# Patient Record
Sex: FEMALE | Race: OTHER | Employment: UNEMPLOYED | URBAN - METROPOLITAN AREA
[De-identification: names, ages, dates, MRNs, and addresses within clinical notes are randomized per-mention and may not be internally consistent; named-entity substitution may affect disease eponyms.]

---

## 2017-11-28 ENCOUNTER — OFFICE VISIT (OUTPATIENT)
Dept: INTERNAL MEDICINE CLINIC | Facility: CLINIC | Age: 22
End: 2017-11-28

## 2017-11-28 VITALS
BODY MASS INDEX: 22.09 KG/M2 | TEMPERATURE: 98 F | WEIGHT: 117 LBS | OXYGEN SATURATION: 98 % | HEART RATE: 80 BPM | SYSTOLIC BLOOD PRESSURE: 100 MMHG | HEIGHT: 61 IN | DIASTOLIC BLOOD PRESSURE: 61 MMHG

## 2017-11-28 DIAGNOSIS — J02.9 SORE THROAT: ICD-10-CM

## 2017-11-28 DIAGNOSIS — J06.9 URI, ACUTE: Primary | ICD-10-CM

## 2017-11-28 DIAGNOSIS — Z76.89 ENCOUNTER TO ESTABLISH CARE: ICD-10-CM

## 2017-11-28 PROCEDURE — 99212 OFFICE O/P EST SF 10 MIN: CPT | Performed by: INTERNAL MEDICINE

## 2017-11-28 PROCEDURE — 99203 OFFICE O/P NEW LOW 30 MIN: CPT | Performed by: INTERNAL MEDICINE

## 2017-11-28 RX ORDER — FLUTICASONE PROPIONATE 50 MCG
2 SPRAY, SUSPENSION (ML) NASAL DAILY
Qty: 1 BOTTLE | Refills: 1 | Status: SHIPPED | OUTPATIENT
Start: 2017-11-28

## 2017-11-28 NOTE — PROGRESS NOTES
HPI:    Patient ID: González Ibrahim is a 25year old female. HPI today complaining of sore throat ,soem pain when she is swallowing for the past 2-3 days. She states that her mom had a similar symptoms prior to her  .   Since yesterday she is having  cough Outpatient Prescriptions:  Fluticasone Propionate 50 MCG/ACT Nasal Suspension 2 sprays by Each Nare route daily. Disp: 1 Bottle Rfl: 1     Allergies:No Known Allergies    HISTORY:  History reviewed. No pertinent past medical history. History reviewed.  No sounds and intact distal pulses. Exam reveals no gallop and no friction rub. No murmur heard. Pulmonary/Chest: Effort normal and breath sounds normal. No accessory muscle usage. No respiratory distress. She has no wheezes. She has no rales.  She exhibi

## 2019-01-09 ENCOUNTER — OFFICE VISIT (OUTPATIENT)
Dept: INTERNAL MEDICINE CLINIC | Facility: CLINIC | Age: 24
End: 2019-01-09

## 2019-01-09 VITALS
TEMPERATURE: 99 F | HEIGHT: 61 IN | DIASTOLIC BLOOD PRESSURE: 78 MMHG | WEIGHT: 129 LBS | SYSTOLIC BLOOD PRESSURE: 110 MMHG | HEART RATE: 78 BPM | RESPIRATION RATE: 18 BRPM | BODY MASS INDEX: 24.35 KG/M2

## 2019-01-09 DIAGNOSIS — J02.9 SORE THROAT: ICD-10-CM

## 2019-01-09 DIAGNOSIS — R10.32 INTERMITTENT LEFT LOWER QUADRANT ABDOMINAL PAIN: Primary | ICD-10-CM

## 2019-01-09 PROCEDURE — 99214 OFFICE O/P EST MOD 30 MIN: CPT | Performed by: INTERNAL MEDICINE

## 2019-01-09 NOTE — PROGRESS NOTES
HPI:    Patient ID: Anthony Vaughan is a 26year old female. HPI      She came in today complaining of left lower quadrant abdominal pain. According to her this symptoms us or go ongoing for almost 2 years.   She had this left lower quadrant abdominal pain hematuria and urgency. Musculoskeletal: Negative for arthralgias, back pain, gait problem, joint swelling, myalgias, neck pain and neck stiffness. Allergic/Immunologic: Negative.     Neurological: Negative for dizziness, tremors, speech difficulty, weak exudate, posterior oropharyngeal edema or posterior oropharyngeal erythema. Eyes: Conjunctivae and EOM are normal. Pupils are equal, round, and reactive to light. Right eye exhibits no discharge. Left eye exhibits no discharge. No scleral icterus.    Neck salt gargles can help, Tylenol as needed ,if pain gets worse,, if nay fever , difficulty swallowing call us     Orders Placed This Encounter      POC Rapid Strep [[id number]]      Meds This Visit:  Requested Prescriptions      No prescriptions requested or orde

## 2019-01-09 NOTE — PATIENT INSTRUCTIONS
Intermittent left lower quadrant abdominal pain  (primary encounter diagnosis) etiology unclear her previous ultrasound showed some left ovarian cyst follow-up with OB tomorrow for possible repeat ultrasound pelvic.   We should rule out a GI etiology as wel

## 2019-01-24 ENCOUNTER — HOSPITAL ENCOUNTER (OUTPATIENT)
Dept: CT IMAGING | Facility: HOSPITAL | Age: 24
Discharge: HOME | End: 2019-01-24
Attending: INTERNAL MEDICINE
Payer: COMMERCIAL

## 2019-01-24 DIAGNOSIS — R10.32 INTERMITTENT LEFT LOWER QUADRANT ABDOMINAL PAIN: ICD-10-CM

## 2019-01-24 LAB — CREAT BLD-MCNC: 0.6 MG/DL (ref 0.5–1.5)

## 2019-01-24 PROCEDURE — 82565 ASSAY OF CREATININE: CPT

## 2019-01-24 PROCEDURE — 74177 CT ABD & PELVIS W/CONTRAST: CPT | Performed by: INTERNAL MEDICINE

## 2019-01-28 ENCOUNTER — TELEPHONE (OUTPATIENT)
Dept: INTERNAL MEDICINE CLINIC | Facility: CLINIC | Age: 24
End: 2019-01-28

## 2019-01-28 NOTE — TELEPHONE ENCOUNTER
Patient called, her ob/gyne Dr. Ramya Castro  is asking for CT results to be faxed to, #[phone]. Ok to send?

## 2019-06-06 ENCOUNTER — OFFICE VISIT (OUTPATIENT)
Dept: INTERNAL MEDICINE CLINIC | Facility: CLINIC | Age: 24
End: 2019-06-06
Payer: COMMERCIAL

## 2019-06-06 VITALS
BODY MASS INDEX: 24.92 KG/M2 | RESPIRATION RATE: 18 BRPM | WEIGHT: 132 LBS | HEIGHT: 61 IN | TEMPERATURE: 99 F | DIASTOLIC BLOOD PRESSURE: 65 MMHG | SYSTOLIC BLOOD PRESSURE: 100 MMHG | HEART RATE: 78 BPM

## 2019-06-06 DIAGNOSIS — R21 RASH: Primary | ICD-10-CM

## 2019-06-06 PROCEDURE — 99213 OFFICE O/P EST LOW 20 MIN: CPT | Performed by: INTERNAL MEDICINE

## 2019-06-06 RX ORDER — DIAPER,BRIEF,INFANT-TODD,DISP
EACH MISCELLANEOUS
Qty: 1 TUBE | Refills: 0 | Status: SHIPPED | OUTPATIENT
Start: 2019-06-06 | End: 2019-12-26 | Stop reason: ALTCHOICE

## 2019-06-06 NOTE — PATIENT INSTRUCTIONS
Contact dermatitis - avoid using makeup , clean with warm water and soap, cortisone cream bid  No more then 5 days , if any swelling , worsening rash call us  Go to er

## 2019-06-06 NOTE — PROGRESS NOTES
HPI:    Patient ID: Cris Farley is a 22year old female. HPI rash      According to her started on Monday , she was at the movie theater and she remove her lipstick with paper and Chapstick. . She states that when she went home she noticed some redness a Medications:  Fluticasone Propionate 50 MCG/ACT Nasal Suspension 2 sprays by Each Nare route daily. Disp: 1 Bottle Rfl: 1     Allergies:No Known Allergies    HISTORY:  No past medical history on file. No past surgical history on file.    Family History heard.  Pulmonary/Chest: Effort normal and breath sounds normal. No accessory muscle usage. No respiratory distress. She has no wheezes. She has no rales. She exhibits no tenderness. Abdominal: Soft.  Bowel sounds are normal. She exhibits no shifting dull

## 2019-11-20 ENCOUNTER — OFFICE VISIT (OUTPATIENT)
Dept: INTERNAL MEDICINE CLINIC | Facility: CLINIC | Age: 24
End: 2019-11-20
Payer: COMMERCIAL

## 2019-11-20 VITALS
HEART RATE: 64 BPM | RESPIRATION RATE: 18 BRPM | BODY MASS INDEX: 24.73 KG/M2 | HEIGHT: 61 IN | SYSTOLIC BLOOD PRESSURE: 97 MMHG | TEMPERATURE: 98 F | DIASTOLIC BLOOD PRESSURE: 66 MMHG | WEIGHT: 131 LBS

## 2019-11-20 DIAGNOSIS — K52.9 CHRONIC DIARRHEA: ICD-10-CM

## 2019-11-20 DIAGNOSIS — Z00.00 ANNUAL PHYSICAL EXAM: Primary | ICD-10-CM

## 2019-11-20 PROCEDURE — 90471 IMMUNIZATION ADMIN: CPT | Performed by: INTERNAL MEDICINE

## 2019-11-20 PROCEDURE — 90686 IIV4 VACC NO PRSV 0.5 ML IM: CPT | Performed by: INTERNAL MEDICINE

## 2019-11-20 PROCEDURE — 96127 BRIEF EMOTIONAL/BEHAV ASSMT: CPT | Performed by: INTERNAL MEDICINE

## 2019-11-20 PROCEDURE — 99395 PREV VISIT EST AGE 18-39: CPT | Performed by: INTERNAL MEDICINE

## 2019-11-20 NOTE — PROGRESS NOTES
HPI:   Cris Farley is a 22year old female who presents for a complete physical exam. Her menstrual period is regular    She does have  chronic diarrhea  2-3   Times  A day.  She never tired gluten free diet    she quit smoking   Wt Readings from Last 3 Enc insomnia. Integumentary Negative Breast discharge, breast lump(s), hair loss and rash. MS Negative Back pain and joint pain. Hema/Lymph Negative Easy bleeding, easy bruising and thromboembolic events.    Allergic/Immuno Negative Environmental allergie is Body mass index is 24.75 kg/m². , recommended low fat diet and aerobic exercise 30 minutes three times weekly. The patient indicates understanding of these issues and agrees to the plan. The patient is asked to return for annual physical in 1 year.

## 2019-12-26 ENCOUNTER — NURSE TRIAGE (OUTPATIENT)
Dept: OTHER | Age: 24
End: 2019-12-26

## 2019-12-26 ENCOUNTER — OFFICE VISIT (OUTPATIENT)
Dept: INTERNAL MEDICINE CLINIC | Facility: CLINIC | Age: 24
End: 2019-12-26
Payer: COMMERCIAL

## 2019-12-26 VITALS
HEART RATE: 78 BPM | TEMPERATURE: 98 F | WEIGHT: 131.81 LBS | SYSTOLIC BLOOD PRESSURE: 113 MMHG | BODY MASS INDEX: 24.89 KG/M2 | DIASTOLIC BLOOD PRESSURE: 72 MMHG | HEIGHT: 61 IN | RESPIRATION RATE: 18 BRPM

## 2019-12-26 DIAGNOSIS — J02.9 SORE THROAT: Primary | ICD-10-CM

## 2019-12-26 PROCEDURE — 87880 STREP A ASSAY W/OPTIC: CPT | Performed by: NURSE PRACTITIONER

## 2019-12-26 PROCEDURE — 99213 OFFICE O/P EST LOW 20 MIN: CPT | Performed by: NURSE PRACTITIONER

## 2019-12-26 RX ORDER — BENZONATATE 100 MG/1
100 CAPSULE ORAL 3 TIMES DAILY PRN
Qty: 20 CAPSULE | Refills: 0 | Status: SHIPPED | OUTPATIENT
Start: 2019-12-26 | End: 2020-01-02

## 2019-12-26 RX ORDER — MULTIVIT WITH MINERALS/LUTEIN
1000 TABLET ORAL DAILY
COMMUNITY

## 2019-12-26 RX ORDER — AZITHROMYCIN 250 MG/1
TABLET, FILM COATED ORAL
Qty: 6 TABLET | Refills: 0 | Status: SHIPPED | OUTPATIENT
Start: 2019-12-26 | End: 2021-01-12 | Stop reason: ALTCHOICE

## 2019-12-26 NOTE — TELEPHONE ENCOUNTER
Action Requested: Summary for Provider     []  Critical Lab, Recommendations Needed  [] Need Additional Advice  []   FYI    []   Need Orders  [] Need Medications Sent to Pharmacy  []  Other     SUMMARY:   Patient states has cough, chest congestion, fatigue

## 2019-12-26 NOTE — PROGRESS NOTES
HPI:    Patient ID: Teri Ocampo is a 22year old female. LMP December 13, 2019. Condoms. One  Partner. 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 HPI  Patient here with upper respiratory infection and sore throat.   Chief Complaint    Cough (x1 wee • Fluticasone Propionate 50 MCG/ACT Nasal Suspension 2 sprays by Each Nare route daily. 1 Bottle 1     Allergies:No Known Allergies   PHYSICAL EXAM:   Physical Exam   Nursing note and vitals reviewed.    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 She complains of fatigue and sore throat. Her throat pain is 6 out of 10 and is constant. She also has an intermittent dry cough.   Has been

## 2020-01-01 ENCOUNTER — APPOINTMENT (OUTPATIENT)
Dept: GENERAL RADIOLOGY | Facility: HOSPITAL | Age: 25
End: 2020-01-01
Attending: EMERGENCY MEDICINE
Payer: COMMERCIAL

## 2020-01-01 PROCEDURE — 71045 X-RAY EXAM CHEST 1 VIEW: CPT | Performed by: EMERGENCY MEDICINE
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
## 2020-01-01 NOTE — ED PROVIDER NOTES
Patient Seen in: [facility] AND [facility] Emergency Department      History   Patient presents with:  Eval-P  Anxiety/Panic attack    Stated Complaint:     HPI    Patient presents to the emergency department today complaining of panic attacks and anxiety as wel and reactive to light. Neck:      Musculoskeletal: Normal range of motion and neck supple. Cardiovascular:      Rate and Rhythm: Normal rate and regular rhythm. Heart sounds: No murmur.    Pulmonary:      Effort: Pulmonary effort is normal. No resp result                 Please view results for these tests on the individual orders. CBC W/ DIFFERENTIAL     EKG    Rate, intervals and axes as noted on EKG Report. Rate: 105 bpm  Rhythm: Sinus Rhythm  Reading: Normal EKG.                       MDM     P

## 2020-01-01 NOTE — ED INITIAL ASSESSMENT (HPI)
Triage: pt arrived ambulatory for panic attacks last night, pt reports \" I think I had 3 panic attacks last night after drinking\"
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
## 2020-07-07 ENCOUNTER — OFFICE VISIT (OUTPATIENT)
Dept: INTERNAL MEDICINE CLINIC | Facility: CLINIC | Age: 25
End: 2020-07-07
Payer: COMMERCIAL

## 2020-07-07 VITALS
HEART RATE: 65 BPM | BODY MASS INDEX: 24.55 KG/M2 | HEIGHT: 61 IN | TEMPERATURE: 99 F | DIASTOLIC BLOOD PRESSURE: 59 MMHG | WEIGHT: 130 LBS | OXYGEN SATURATION: 98 % | SYSTOLIC BLOOD PRESSURE: 93 MMHG

## 2020-07-07 DIAGNOSIS — E53.8 B12 DEFICIENCY: ICD-10-CM

## 2020-07-07 DIAGNOSIS — R10.2 PELVIC PAIN: Primary | ICD-10-CM

## 2020-07-07 DIAGNOSIS — E55.9 VITAMIN D DEFICIENCY: ICD-10-CM

## 2020-07-07 PROCEDURE — 99214 OFFICE O/P EST MOD 30 MIN: CPT | Performed by: INTERNAL MEDICINE

## 2020-07-07 NOTE — PROGRESS NOTES
HPI:    Patient ID: Cris Farley is a 26year old female. HPI She came in today complaining of urinary frequency according to her this is ongoing for a long time on and off.   She states that that she has the frequency and some lower abdominal cramping bu (VITAMIN C) 1000 MG Oral Tab Take 1,000 mg by mouth daily. • Probiotic Product (PROBIOTIC OR) Take by mouth. • Fluticasone Propionate 50 MCG/ACT Nasal Suspension 2 sprays by Each Nare route daily.  1 Bottle 1   • clonazePAM 0.5 MG Oral Tab Take 1 ta Conjunctivae and EOM are normal. Right eye exhibits no discharge. Left eye exhibits no discharge. No scleral icterus. Neck: Normal range of motion. Neck supple. No JVD present. No tracheal tenderness present. No tracheal deviation present.  No thyroid mas

## 2020-07-08 LAB
VITAMIN B12: 407 PG/ML (ref 200–1100)
VITAMIN D, 25-OH, TOTAL: 18 NG/ML (ref 30–100)

## 2020-07-08 NOTE — PROGRESS NOTES
Left message to call back. Trov not read yet. Dr Carrillo=do you want to do repeat Vit D level after 12 weeks?

## 2020-12-19 ENCOUNTER — LAB ENCOUNTER (OUTPATIENT)
Dept: LAB | Age: 25
End: 2020-12-19
Attending: INTERNAL MEDICINE
Payer: COMMERCIAL

## 2020-12-19 ENCOUNTER — NURSE TRIAGE (OUTPATIENT)
Dept: INTERNAL MEDICINE CLINIC | Facility: CLINIC | Age: 25
End: 2020-12-19

## 2020-12-19 DIAGNOSIS — R05.9 COUGH: Primary | ICD-10-CM

## 2020-12-19 DIAGNOSIS — R05.9 COUGH: ICD-10-CM

## 2020-12-19 NOTE — TELEPHONE ENCOUNTER
Action Requested: Summary for Provider     []  Critical Lab, Recommendations Needed  [x] Need Additional Advice  []   FYI    []   Need Orders  [] Need Medications Sent to Pharmacy  []  Other     SUMMARY: pt states she has body aches, chills, cough, some na

## 2020-12-21 ENCOUNTER — TELEPHONE (OUTPATIENT)
Dept: INTERNAL MEDICINE CLINIC | Facility: CLINIC | Age: 25
End: 2020-12-21

## 2020-12-21 ENCOUNTER — TELEMEDICINE (OUTPATIENT)
Dept: INTERNAL MEDICINE CLINIC | Facility: CLINIC | Age: 25
End: 2020-12-21
Payer: COMMERCIAL

## 2020-12-21 DIAGNOSIS — U07.1 COVID-19: Primary | ICD-10-CM

## 2020-12-21 PROCEDURE — 99213 OFFICE O/P EST LOW 20 MIN: CPT | Performed by: INTERNAL MEDICINE

## 2020-12-21 NOTE — PROGRESS NOTES
This is a telemedicine visit with live, interactive video and audio. Patient understands and accepts financial responsibility for any deductible, co-insurance and/or co-pays associated with this service.     SUBJECTIVE  covid 19  The patient shoe went t self quarantine, monitor symptoms, drink more fluids, take Tylenol/vitamin C/zinc, wash hands, if you develop any worsening cough shortness of breath,worsening  Cough call us   Time 6 min  Bala Juárez MD

## 2020-12-21 NOTE — TELEPHONE ENCOUNTER
Pt was already informed of result as per result note copied here; and pt has scheduled Dox[facility] appt for today with Dr Kallie Duarte. Setting for COVID f/u at a later date.      Dylon Delay   12/21/2020  2:28 PM      Pt has been informed of result and Md's advise

## 2020-12-21 NOTE — TELEPHONE ENCOUNTER
Encounter routed to the home monitoring team for follow-up.       ----- Message from Filemon Mccann MD sent at 12/21/2020  2:16 PM CST -----  Her test for covid is positive , she needs to self quarantine monitor symptoms drink more fluids, take vitamin C/z

## 2020-12-23 NOTE — TELEPHONE ENCOUNTER
What  was your temp today? - 98.7    How did you take your temp? Oral thermometer    Are you feeling short of breath today? Yes    Is the shortness of breath better, the same, or worse than yesterday? Same    Are you having a cough today?   Yes    Is the

## 2020-12-28 NOTE — TELEPHONE ENCOUNTER
What  was your temp today? - 97.2    How did you take your temp?     with an forehead thermometer    Are you feeling short of breath today?    No      Is the shortness of breath better, the same, or worse than yesterday?   n/a    Are you having a cough toda

## 2021-01-12 ENCOUNTER — HOSPITAL ENCOUNTER (OUTPATIENT)
Dept: GENERAL RADIOLOGY | Age: 26
Discharge: HOME | End: 2021-01-12
Attending: INTERNAL MEDICINE
Payer: COMMERCIAL

## 2021-01-12 ENCOUNTER — OFFICE VISIT (OUTPATIENT)
Dept: INTERNAL MEDICINE CLINIC | Facility: CLINIC | Age: 26
End: 2021-01-12
Payer: COMMERCIAL

## 2021-01-12 VITALS
BODY MASS INDEX: 27.38 KG/M2 | HEART RATE: 75 BPM | SYSTOLIC BLOOD PRESSURE: 106 MMHG | DIASTOLIC BLOOD PRESSURE: 71 MMHG | WEIGHT: 145 LBS | HEIGHT: 61 IN

## 2021-01-12 DIAGNOSIS — M25.511 RIGHT SHOULDER PAIN, UNSPECIFIED CHRONICITY: ICD-10-CM

## 2021-01-12 DIAGNOSIS — G44.309 POST-TRAUMATIC HEADACHE, NOT INTRACTABLE, UNSPECIFIED CHRONICITY PATTERN: Primary | ICD-10-CM

## 2021-01-12 PROCEDURE — 73030 X-RAY EXAM OF SHOULDER: CPT | Performed by: INTERNAL MEDICINE

## 2021-01-12 PROCEDURE — 3074F SYST BP LT 130 MM HG: CPT | Performed by: INTERNAL MEDICINE

## 2021-01-12 PROCEDURE — 3008F BODY MASS INDEX DOCD: CPT | Performed by: INTERNAL MEDICINE

## 2021-01-12 PROCEDURE — 3078F DIAST BP <80 MM HG: CPT | Performed by: INTERNAL MEDICINE

## 2021-01-12 PROCEDURE — 99214 OFFICE O/P EST MOD 30 MIN: CPT | Performed by: INTERNAL MEDICINE
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
## 2021-01-19 ENCOUNTER — OFFICE VISIT (OUTPATIENT)
Dept: NEUROLOGY | Facility: CLINIC | Age: 26
End: 2021-01-19
Payer: COMMERCIAL

## 2021-01-19 ENCOUNTER — TELEPHONE (OUTPATIENT)
Dept: NEUROLOGY | Facility: CLINIC | Age: 26
End: 2021-01-19

## 2021-01-19 VITALS
WEIGHT: 145 LBS | SYSTOLIC BLOOD PRESSURE: 120 MMHG | HEIGHT: 61 IN | BODY MASS INDEX: 27.38 KG/M2 | DIASTOLIC BLOOD PRESSURE: 60 MMHG

## 2021-01-19 DIAGNOSIS — G44.85 STABBING HEADACHE: Primary | ICD-10-CM

## 2021-01-19 DIAGNOSIS — R41.3 MEMORY IMPAIRMENT: ICD-10-CM

## 2021-01-19 DIAGNOSIS — G47.00 INSOMNIA, UNSPECIFIED TYPE: ICD-10-CM

## 2021-01-19 PROCEDURE — 3008F BODY MASS INDEX DOCD: CPT | Performed by: OTHER

## 2021-01-19 PROCEDURE — 99205 OFFICE O/P NEW HI 60 MIN: CPT | Performed by: OTHER

## 2021-01-19 PROCEDURE — 3074F SYST BP LT 130 MM HG: CPT | Performed by: OTHER

## 2021-01-19 PROCEDURE — 3078F DIAST BP <80 MM HG: CPT | Performed by: OTHER

## 2021-01-19 NOTE — TELEPHONE ENCOUNTER
Stephan for authorization of approval for MRI pituitary wo cpt code 59502. Authorization Number: [id number] effective 01/19/21 to 07/18/21. MRI is scheduled on 01/21/21.

## 2021-01-19 NOTE — PATIENT INSTRUCTIONS
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 There are treatments, but they can cause birth defects (they are teratogenic) therefore if you think it is not worth the risk to the fetus we should avoid them. Plan:  1.  Keep a emily
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
## 2021-01-20 ENCOUNTER — TELEPHONE (OUTPATIENT)
Dept: NEUROLOGY | Facility: CLINIC | Age: 26
End: 2021-01-20

## 2021-01-20 DIAGNOSIS — G44.85 STABBING HEADACHE: Primary | ICD-10-CM

## 2021-01-20 NOTE — TELEPHONE ENCOUNTER
MRI order changed to MRI pituitary with and without contrast, to have beta HCG drawn prior to MRI. Left message on patient voice mail to call office for urther instructions for MRI.  Will need to be told HCG needs to be drawn and results back tomorrow jamie

## 2021-01-20 NOTE — TELEPHONE ENCOUNTER
New order placed for MRI Pituitary w+wo CPT 84968    Contacted Deb Dozier, Nurse Reviewer at [facility] to change authorization from CPT 96160 to 95725.    Jarod Jason approved request with same authorization #[id number] and effective date 1/19/2021-7/18/2021  Statu

## 2021-01-20 NOTE — TELEPHONE ENCOUNTER
Pituitary MRI ordered by Dr Pawel aGlicia. MRI checking to see if order should be with and without contrast. Please advise.    Scheduled for MRI 1/21/21 6 30 pm.

## 2021-01-21 ENCOUNTER — HOSPITAL ENCOUNTER (OUTPATIENT)
Dept: MRI IMAGING | Facility: HOSPITAL | Age: 26
Discharge: HOME | End: 2021-01-21
Attending: Other
Payer: COMMERCIAL

## 2021-01-21 ENCOUNTER — LAB ENCOUNTER (OUTPATIENT)
Dept: LAB | Facility: REFERENCE LAB | Age: 26
End: 2021-01-21
Attending: Other
Payer: COMMERCIAL

## 2021-01-21 DIAGNOSIS — G44.85 STABBING HEADACHE: ICD-10-CM

## 2021-01-21 LAB — B-HCG SERPL-ACNC: <1 MIU/ML

## 2021-01-21 PROCEDURE — A9575 INJ GADOTERATE MEGLUMI 0.1ML: HCPCS | Performed by: OTHER

## 2021-01-21 PROCEDURE — 70553 MRI BRAIN STEM W/O & W/DYE: CPT | Performed by: OTHER

## 2021-01-21 PROCEDURE — 36415 COLL VENOUS BLD VENIPUNCTURE: CPT | Performed by: OTHER

## 2021-01-21 PROCEDURE — 84702 CHORIONIC GONADOTROPIN TEST: CPT | Performed by: OTHER

## 2021-01-21 NOTE — TELEPHONE ENCOUNTER
Left message on patient voice mail to come in 1 hour earlier for blood test prior to MRI with contrast that is scheduled for this evening. Advised to call office back for any questions.    Dr Pacheco Tolbert requests serum pregnancy test prior to MRI pituitary with

## 2021-01-21 NOTE — TELEPHONE ENCOUNTER
Spoke to MRI, advised order changed to Pituitary with and without contrast. Will have serum pregnancy test done first, will not be able to have contrast if pregnant.

## 2021-01-21 NOTE — TELEPHONE ENCOUNTER
Patient notified of the message per Driss Stallworth RN.  Patient verbalized understanding without further questions

## 2021-02-10 ENCOUNTER — TELEPHONE (OUTPATIENT)
Dept: INTERNAL MEDICINE CLINIC | Facility: CLINIC | Age: 26
End: 2021-02-10

## 2021-02-10 NOTE — TELEPHONE ENCOUNTER
Patient is requesting that Dr. Josue Cdi recommend patient to midwives due to patient finding out she is pregnant.

## 2021-08-04 ENCOUNTER — HOSPITAL ENCOUNTER (OUTPATIENT)
Dept: PERINATAL CARE | Facility: HOSPITAL | Age: 26
Setting detail: OBSERVATION
Discharge: HOME | End: 2021-08-04
Attending: OBSTETRICS & GYNECOLOGY
Payer: COMMERCIAL

## 2021-08-04 ENCOUNTER — HOSPITAL ENCOUNTER (OUTPATIENT)
Facility: HOSPITAL | Age: 26
Setting detail: OBSERVATION
Discharge: HOME | End: 2021-08-04
Attending: OBSTETRICS & GYNECOLOGY | Admitting: OBSTETRICS & GYNECOLOGY
Payer: COMMERCIAL

## 2021-08-04 VITALS — SYSTOLIC BLOOD PRESSURE: 108 MMHG | TEMPERATURE: 99 F | HEART RATE: 99 BPM | DIASTOLIC BLOOD PRESSURE: 68 MMHG

## 2021-08-04 DIAGNOSIS — O46.93 VAGINAL BLEEDING IN PREGNANCY, THIRD TRIMESTER: Primary | ICD-10-CM

## 2021-08-04 PROBLEM — Z34.90 PREGNANCY: Status: ACTIVE | Noted: 2021-08-04

## 2021-08-04 LAB
ANTIBODY SCREEN: NEGATIVE
BILIRUB UR QL: NEGATIVE
COLOR UR: YELLOW
DEPRECATED RDW RBC AUTO: 41 FL (ref 35.1–46.3)
ERYTHROCYTE [DISTWIDTH] IN BLOOD BY AUTOMATED COUNT: 13.6 % (ref 11–15)
GLUCOSE UR-MCNC: NEGATIVE MG/DL
HCT VFR BLD AUTO: 29.3 %
HGB BLD-MCNC: 9.4 G/DL
KETONES UR-MCNC: NEGATIVE MG/DL
MCH RBC QN AUTO: 27.6 PG (ref 26–34)
MCHC RBC AUTO-ENTMCNC: 32.1 G/DL (ref 31–37)
MCV RBC AUTO: 86.2 FL
NITRITE UR QL STRIP.AUTO: NEGATIVE
PH UR: 6 [PH] (ref 5–8)
PLATELET # BLD AUTO: 191 10(3)UL (ref 150–450)
PROT UR-MCNC: 30 MG/DL
RBC # BLD AUTO: 3.4 X10(6)UL
RBC #/AREA URNS AUTO: >10 /HPF
RH BLOOD TYPE: POSITIVE
SP GR UR STRIP: 1.01 (ref 1–1.03)
UROBILINOGEN UR STRIP-ACNC: <2
WBC # BLD AUTO: 9.2 X10(3) UL (ref 4–11)

## 2021-08-04 PROCEDURE — 99219 INITIAL OBSERVATION CARE,LEVL II: CPT | Performed by: OBSTETRICS & GYNECOLOGY

## 2021-08-04 PROCEDURE — 76811 OB US DETAILED SNGL FETUS: CPT | Performed by: OBSTETRICS & GYNECOLOGY

## 2021-08-04 RX ORDER — NITROFURANTOIN 25; 75 MG/1; MG/1
100 CAPSULE ORAL 2 TIMES DAILY
Qty: 14 CAPSULE | Refills: 0 | Status: SHIPPED | OUTPATIENT
Start: 2021-08-04 | End: 2021-08-11

## 2021-08-04 NOTE — CONSULTS
[facility] Patient Status:  Observation    1995 MRN [id number]   Location [address] [place] [address] Attending Prem Pittman MD   Hosp Day # 0 PCP John Rodas MD     SUBJECTIVE:  Reason no masses,  no organomegaly  Extremities: extremities normal, atraumatic, no cyanosis or edema  Neurologic: Grossly normal    Diagnostics:      OB ULTRASOUND REPORT   See imaging tab for complete ultrasound report or in PACS    Single IUP in cephalic prese

## 2021-08-04 NOTE — PROGRESS NOTES
Pt back from [address]. She is comfortable. Denies feeling contractions or cramping. Cervix: ft/th/hi  Increase hydration and recheck cervix in 1-2 hours. If no change and no further active bleeding DC home with follow up with her midwife group.

## 2021-08-04 NOTE — H&P
Manfred Manzo 69 Patient Status:  Observation    1995 MRN [id number]   Location [address] [facility] Attending Trent Castellanos MD   Hosp Day # 0 PCP Suhail Galdamez MD     Date of Adm mouth., Disp: , Rfl:   Fluticasone Propionate 50 MCG/ACT Nasal Suspension, 2 sprays by Each Nare route daily. , Disp: 1 Bottle, Rfl: 1      Allergies: No Known Allergies    OBJECTIVE:         General: Alert and Oriented  Lungs: Non-labored breathing  Abdome

## 2021-08-05 LAB
C TRACH DNA SPEC QL NAA+PROBE: NEGATIVE
N GONORRHOEA DNA SPEC QL NAA+PROBE: NEGATIVE

## 2021-08-05 NOTE — TRIAGE
[facility] HOSP - [facility]      Triage Note    Deloise Line Patient Status:  Observation    1995 MRN [id number]   Location [address] [facility] Attending Ava Hernández MD   Hosp Day # 0 PCP MD Samara Shah Contractions: Irregular (occasional, mild)                                        Acoustic Stimulator: No           Nonstress Test Interpretation: Appropriate for gestational age           Nonstress Test Second Interpretation: Appropriate for gestati

## 2021-09-13 ENCOUNTER — HOSPITAL ENCOUNTER (EMERGENCY)
Facility: HOSPITAL | Age: 26
Discharge: HOME | End: 2021-09-13
Attending: EMERGENCY MEDICINE
Payer: COMMERCIAL

## 2021-09-13 VITALS
DIASTOLIC BLOOD PRESSURE: 78 MMHG | RESPIRATION RATE: 16 BRPM | TEMPERATURE: 98 F | BODY MASS INDEX: 26.62 KG/M2 | OXYGEN SATURATION: 97 % | HEIGHT: 61 IN | WEIGHT: 141 LBS | SYSTOLIC BLOOD PRESSURE: 120 MMHG | HEART RATE: 78 BPM

## 2021-09-13 DIAGNOSIS — Z3A.36 36 WEEKS GESTATION OF PREGNANCY: Primary | ICD-10-CM

## 2021-09-13 LAB
ALBUMIN SERPL-MCNC: 2.3 G/DL (ref 3.4–5)
ALP LIVER SERPL-CCNC: 236 U/L
ALT SERPL-CCNC: 17 U/L
ANION GAP SERPL CALC-SCNC: 6 MMOL/L (ref 0–18)
AST SERPL-CCNC: 21 U/L (ref 15–37)
BASOPHILS # BLD AUTO: 0.04 X10(3) UL (ref 0–0.2)
BASOPHILS NFR BLD AUTO: 0.5 %
BILIRUB DIRECT SERPL-MCNC: <0.1 MG/DL (ref 0–0.2)
BILIRUB SERPL-MCNC: 0.2 MG/DL (ref 0.1–2)
BUN BLD-MCNC: 3 MG/DL (ref 7–18)
BUN/CREAT SERPL: 6.1 (ref 10–20)
CALCIUM BLD-MCNC: 8.6 MG/DL (ref 8.5–10.1)
CHLORIDE SERPL-SCNC: 111 MMOL/L (ref 98–112)
CO2 SERPL-SCNC: 22 MMOL/L (ref 21–32)
CREAT BLD-MCNC: 0.49 MG/DL
DEPRECATED RDW RBC AUTO: 49.6 FL (ref 35.1–46.3)
EOSINOPHIL # BLD AUTO: 0.09 X10(3) UL (ref 0–0.7)
EOSINOPHIL NFR BLD AUTO: 1.1 %
ERYTHROCYTE [DISTWIDTH] IN BLOOD BY AUTOMATED COUNT: 15.7 % (ref 11–15)
GLUCOSE BLD-MCNC: 103 MG/DL (ref 70–99)
HCT VFR BLD AUTO: 31.6 %
HGB BLD-MCNC: 10.2 G/DL
IMM GRANULOCYTES # BLD AUTO: 0.05 X10(3) UL (ref 0–1)
IMM GRANULOCYTES NFR BLD: 0.6 %
LYMPHOCYTES # BLD AUTO: 1.53 X10(3) UL (ref 1–4)
LYMPHOCYTES NFR BLD AUTO: 18.6 %
M PROTEIN MFR SERPL ELPH: 6.6 G/DL (ref 6.4–8.2)
MCH RBC QN AUTO: 28.1 PG (ref 26–34)
MCHC RBC AUTO-ENTMCNC: 32.3 G/DL (ref 31–37)
MCV RBC AUTO: 87.1 FL
MONOCYTES # BLD AUTO: 0.52 X10(3) UL (ref 0.1–1)
MONOCYTES NFR BLD AUTO: 6.3 %
NEUTROPHILS # BLD AUTO: 6 X10 (3) UL (ref 1.5–7.7)
NEUTROPHILS # BLD AUTO: 6 X10(3) UL (ref 1.5–7.7)
NEUTROPHILS NFR BLD AUTO: 72.9 %
OSMOLALITY SERPL CALC.SUM OF ELEC: 285 MOSM/KG (ref 275–295)
PLATELET # BLD AUTO: 162 10(3)UL (ref 150–450)
POTASSIUM SERPL-SCNC: 3.5 MMOL/L (ref 3.5–5.1)
RBC # BLD AUTO: 3.63 X10(6)UL
SODIUM SERPL-SCNC: 139 MMOL/L (ref 136–145)
WBC # BLD AUTO: 8.2 X10(3) UL (ref 4–11)

## 2021-09-13 PROCEDURE — 99283 EMERGENCY DEPT VISIT LOW MDM: CPT

## 2021-09-13 PROCEDURE — 80076 HEPATIC FUNCTION PANEL: CPT | Performed by: EMERGENCY MEDICINE

## 2021-09-13 PROCEDURE — 85025 COMPLETE CBC W/AUTO DIFF WBC: CPT | Performed by: EMERGENCY MEDICINE

## 2021-09-13 PROCEDURE — 36415 COLL VENOUS BLD VENIPUNCTURE: CPT

## 2021-09-13 PROCEDURE — 80048 BASIC METABOLIC PNL TOTAL CA: CPT | Performed by: EMERGENCY MEDICINE

## 2021-09-13 NOTE — ED QUICK NOTES
Discharge order placed by ER MD Kyara Winter. At time of discharge, patient AAO x4, speech clear, respirations regular and nonlabored, patient is able to ambulate unassisted with steady gait. She denies any complaints.  Patient verbalizes understanding of discharge

## 2021-09-13 NOTE — ED INITIAL ASSESSMENT (HPI)
Pt was advised by her midwife to come to ED for eval in regards to abnormal calcium and sodium being low. Pt states her only symptom is hand cramping.  Pt is 36 weeks

## 2021-09-13 NOTE — ED QUICK NOTES
Usman Welch reports lab draw by OBGYN last week, was called today with \"critical\" results and instructed to present to ED. She denies any physical complaints. Currently 36 weeks pregnant, denies cramping or abnormal vaginal discharge.

## 2021-09-13 NOTE — ED PROVIDER NOTES
Patient Seen in: [facility] AND [facility] Emergency Department    History   Patient presents with:  Abnormal Result    Stated Complaint: abnormal labs 36 weeks preg    HPI    Patient complains of abnl labs.   Patient had blood work done last week for itching and 26.64 kg/m²    PULSE OX The pulse oximeter revealed 99%  on room air which is not hypoxic and normal for the patient as interpreted by me.     GENERAL: awake alert no distress  HEAD: normocephalic, atraumatic,   EYES: PERRLA, EOMI, conj sclera clear  THROAT Monitor: Pulse Readings from Last 1 Encounters:  09/13/21 : 78  , sinus,      Radiology findings: No results found.     Procedures:      Critical Care:        MDM      Labs nl suspect lab error, will fu with primary, results copy given to patient

## 2022-02-08 ENCOUNTER — OFFICE VISIT (OUTPATIENT)
Dept: INTERNAL MEDICINE CLINIC | Facility: CLINIC | Age: 27
End: 2022-02-08
Payer: COMMERCIAL

## 2022-02-08 VITALS
TEMPERATURE: 98 F | HEIGHT: 61 IN | SYSTOLIC BLOOD PRESSURE: 103 MMHG | DIASTOLIC BLOOD PRESSURE: 66 MMHG | BODY MASS INDEX: 22.66 KG/M2 | WEIGHT: 120 LBS | HEART RATE: 79 BPM

## 2022-02-08 DIAGNOSIS — R19.7 DIARRHEA, UNSPECIFIED TYPE: ICD-10-CM

## 2022-02-08 DIAGNOSIS — D50.8 OTHER IRON DEFICIENCY ANEMIA: Primary | ICD-10-CM

## 2022-02-08 LAB
ANION GAP SERPL CALC-SCNC: 6 MMOL/L (ref 0–18)
BASOPHILS # BLD AUTO: 0.06 X10(3) UL (ref 0–0.2)
BASOPHILS NFR BLD AUTO: 0.9 %
BUN BLD-MCNC: 12 MG/DL (ref 7–18)
BUN/CREAT SERPL: 18.8 (ref 10–20)
CALCIUM BLD-MCNC: 9.3 MG/DL (ref 8.5–10.1)
CHLORIDE SERPL-SCNC: 106 MMOL/L (ref 98–112)
CREAT BLD-MCNC: 0.64 MG/DL
DEPRECATED HBV CORE AB SER IA-ACNC: 10.9 NG/ML
DEPRECATED RDW RBC AUTO: 39.7 FL (ref 35.1–46.3)
EOSINOPHIL # BLD AUTO: 0.25 X10(3) UL (ref 0–0.7)
EOSINOPHIL NFR BLD AUTO: 3.9 %
ERYTHROCYTE [DISTWIDTH] IN BLOOD BY AUTOMATED COUNT: 12.4 % (ref 11–15)
FASTING STATUS PATIENT QL REPORTED: NO
GLUCOSE BLD-MCNC: 72 MG/DL (ref 70–99)
HCT VFR BLD AUTO: 39.2 %
HGB BLD-MCNC: 12.9 G/DL
IMM GRANULOCYTES # BLD AUTO: 0.01 X10(3) UL (ref 0–1)
IMM GRANULOCYTES NFR BLD: 0.2 %
IRON SATN MFR SERPL: 14 %
IRON SERPL-MCNC: 72 UG/DL
LYMPHOCYTES # BLD AUTO: 2.5 X10(3) UL (ref 1–4)
LYMPHOCYTES NFR BLD AUTO: 39.1 %
MCH RBC QN AUTO: 28.9 PG (ref 26–34)
MCHC RBC AUTO-ENTMCNC: 32.9 G/DL (ref 31–37)
MCV RBC AUTO: 87.7 FL
MONOCYTES # BLD AUTO: 0.38 X10(3) UL (ref 0.1–1)
MONOCYTES NFR BLD AUTO: 5.9 %
NEUTROPHILS # BLD AUTO: 3.19 X10 (3) UL (ref 1.5–7.7)
NEUTROPHILS # BLD AUTO: 3.19 X10(3) UL (ref 1.5–7.7)
NEUTROPHILS NFR BLD AUTO: 50 %
OSMOLALITY SERPL CALC.SUM OF ELEC: 282 MOSM/KG (ref 275–295)
PLATELET # BLD AUTO: 248 10(3)UL (ref 150–450)
POTASSIUM SERPL-SCNC: 3.7 MMOL/L (ref 3.5–5.1)
RBC # BLD AUTO: 4.47 X10(6)UL
SODIUM SERPL-SCNC: 137 MMOL/L (ref 136–145)
TIBC SERPL-MCNC: 520 UG/DL (ref 240–450)
TRANSFERRIN SERPL-MCNC: 349 MG/DL (ref 200–360)
WBC # BLD AUTO: 6.4 X10(3) UL (ref 4–11)

## 2022-02-08 PROCEDURE — 3078F DIAST BP <80 MM HG: CPT | Performed by: INTERNAL MEDICINE

## 2022-02-08 PROCEDURE — 99214 OFFICE O/P EST MOD 30 MIN: CPT | Performed by: INTERNAL MEDICINE

## 2022-02-08 PROCEDURE — 36415 COLL VENOUS BLD VENIPUNCTURE: CPT | Performed by: INTERNAL MEDICINE

## 2022-02-08 PROCEDURE — 3008F BODY MASS INDEX DOCD: CPT | Performed by: INTERNAL MEDICINE

## 2022-02-08 PROCEDURE — 3074F SYST BP LT 130 MM HG: CPT | Performed by: INTERNAL MEDICINE

## 2022-02-09 NOTE — PROGRESS NOTES
Pt informed of lab results and instructions to start on rx for iron once a day. Rx has been sent to her pharmacy.

## 2022-03-16 ENCOUNTER — TELEPHONE (OUTPATIENT)
Dept: INTERNAL MEDICINE CLINIC | Facility: CLINIC | Age: 27
End: 2022-03-16

## 2022-03-16 NOTE — TELEPHONE ENCOUNTER
Patient is requesting to have the Iron supplements transferred to [facility] pharmacy. [id number] - [place], [place] - [address]    Iron, Ferrous Sulfate, 325 (65 Fe) MG Oral Tab 90 tablet 0 2/9/2022 3/11/2022    Sig - Route:  Take 325 mg by mouth daily. - Oral

## 2022-04-19 ENCOUNTER — TELEPHONE (OUTPATIENT)
Dept: NEUROLOGY | Facility: CLINIC | Age: 27
End: 2022-04-19

## 2022-05-04 ENCOUNTER — TELEMEDICINE (OUTPATIENT)
Dept: INTERNAL MEDICINE CLINIC | Facility: CLINIC | Age: 27
End: 2022-05-04

## 2022-05-04 DIAGNOSIS — J06.9 URI, ACUTE: Primary | ICD-10-CM

## 2022-05-04 PROCEDURE — 99213 OFFICE O/P EST LOW 20 MIN: CPT | Performed by: INTERNAL MEDICINE

## 2022-05-04 RX ORDER — MONTELUKAST SODIUM 10 MG/1
10 TABLET ORAL DAILY
Qty: 30 TABLET | Refills: 0 | Status: SHIPPED | OUTPATIENT
Start: 2022-05-04 | End: 2023-04-29

## 2022-05-04 RX ORDER — MONTELUKAST SODIUM 10 MG/1
10 TABLET ORAL DAILY
Qty: 30 TABLET | Refills: 0 | Status: SHIPPED | OUTPATIENT
Start: 2022-05-04 | End: 2022-05-04

## 2022-06-15 ENCOUNTER — TELEPHONE (OUTPATIENT)
Dept: INTERNAL MEDICINE CLINIC | Facility: CLINIC | Age: 27
End: 2022-06-15

## 2022-06-15 NOTE — TELEPHONE ENCOUNTER
Fabrice Ceballos from [facility] requesting Shoulder xray report from 01/12/22     . [phone]    Fax [phone]

## 2022-06-15 NOTE — TELEPHONE ENCOUNTER
Yun burgos nurse from [facility] office called regarding the pt needs a referral for x-ray follow up.

## 2022-06-16 NOTE — TELEPHONE ENCOUNTER
Message has been left for Sonu Peña letting her know that she needs to call medical records dept. to request report from them. Number left on  [phone].

## 2022-06-21 ENCOUNTER — LAB ENCOUNTER (OUTPATIENT)
Dept: LAB | Facility: HOSPITAL | Age: 27
End: 2022-06-21
Attending: INTERNAL MEDICINE
Payer: COMMERCIAL

## 2022-06-21 ENCOUNTER — OFFICE VISIT (OUTPATIENT)
Dept: GASTROENTEROLOGY | Facility: CLINIC | Age: 27
End: 2022-06-21
Payer: COMMERCIAL

## 2022-06-21 VITALS
WEIGHT: 131.81 LBS | SYSTOLIC BLOOD PRESSURE: 90 MMHG | HEART RATE: 58 BPM | HEIGHT: 61 IN | DIASTOLIC BLOOD PRESSURE: 58 MMHG | BODY MASS INDEX: 24.89 KG/M2

## 2022-06-21 DIAGNOSIS — R19.7 DIARRHEA, UNSPECIFIED TYPE: Primary | ICD-10-CM

## 2022-06-21 LAB
IGA SERPL-MCNC: 283 MG/DL (ref 70–312)
TSI SER-ACNC: 1.58 MIU/ML (ref 0.36–3.74)

## 2022-06-21 PROCEDURE — 36415 COLL VENOUS BLD VENIPUNCTURE: CPT | Performed by: INTERNAL MEDICINE

## 2022-06-21 PROCEDURE — 86364 TISS TRNSGLTMNASE EA IG CLAS: CPT | Performed by: INTERNAL MEDICINE

## 2022-06-21 PROCEDURE — 3078F DIAST BP <80 MM HG: CPT | Performed by: INTERNAL MEDICINE

## 2022-06-21 PROCEDURE — 82784 ASSAY IGA/IGD/IGG/IGM EACH: CPT | Performed by: INTERNAL MEDICINE

## 2022-06-21 PROCEDURE — 3008F BODY MASS INDEX DOCD: CPT | Performed by: INTERNAL MEDICINE

## 2022-06-21 PROCEDURE — 3074F SYST BP LT 130 MM HG: CPT | Performed by: INTERNAL MEDICINE

## 2022-06-21 PROCEDURE — 84443 ASSAY THYROID STIM HORMONE: CPT | Performed by: INTERNAL MEDICINE

## 2022-06-21 PROCEDURE — 99244 OFF/OP CNSLTJ NEW/EST MOD 40: CPT | Performed by: INTERNAL MEDICINE

## 2022-06-21 RX ORDER — SODIUM FLUORIDE 6 MG/ML
1 PASTE, DENTIFRICE DENTAL AS DIRECTED
COMMUNITY
Start: 2022-05-12

## 2022-06-21 NOTE — PATIENT INSTRUCTIONS
1. Complete your blood and stool tests     Blood tests - TSH to check your thyroid; immunoglobulin A and tissue transglutaminase antibody to check for celiac disease    Stool tests - fecal calprotectin to check for gut inflammation; c.diff and giardia and crypto antigens to check for infection

## 2022-06-24 ENCOUNTER — LAB ENCOUNTER (OUTPATIENT)
Dept: LAB | Facility: HOSPITAL | Age: 27
End: 2022-06-24
Attending: INTERNAL MEDICINE
Payer: COMMERCIAL

## 2022-06-24 DIAGNOSIS — R19.7 DIARRHEA, UNSPECIFIED TYPE: ICD-10-CM

## 2022-06-24 LAB
CRYPTOSP AG STL QL IA: NEGATIVE
G LAMBLIA AG STL QL IA: NEGATIVE
TTG IGA SER-ACNC: 1.7 U/ML (ref ?–7)

## 2022-06-24 PROCEDURE — 87272 CRYPTOSPORIDIUM AG IF: CPT

## 2022-06-24 PROCEDURE — 87329 GIARDIA AG IA: CPT

## 2022-06-24 PROCEDURE — 87493 C DIFF AMPLIFIED PROBE: CPT

## 2022-06-25 LAB — C DIFF TOX B STL QL: NEGATIVE

## 2022-06-29 ENCOUNTER — TELEPHONE (OUTPATIENT)
Dept: GASTROENTEROLOGY | Facility: CLINIC | Age: 27
End: 2022-06-29

## 2022-06-29 DIAGNOSIS — R19.7 DIARRHEA, UNSPECIFIED TYPE: Primary | ICD-10-CM

## 2022-06-29 NOTE — TELEPHONE ENCOUNTER
Dr. Tiffanie Tam,    I spoke to Carrol Pereira in lab who states that someone in phlebotomy lab activated order too soon for fecal calprotectin, so when the patient went to submit the sample there was no active order for it, the lab only saw c.diff and giardia. She is unable to add on now due to not having a stable sample. She removed the previous order, marked as error. If you would like patient to complete this, a new sample must be provided and new order placed. Please advise and I can inform the patient, thank you.

## 2022-06-29 NOTE — TELEPHONE ENCOUNTER
GI staff:    Please let the patient know the patient's blood tests show her thyroid is functioning properly and does not suggest she has celiac disease    It appears the stool tests collected for c.diff, giardia and crypto were collected and show no infection but unclear to me why the fecal calprotectin was not sent which was ordered at the same time. Please find out why the lab did not run this and if they can. If not then the lab should let the patient know of their error.     Thanks    Heide Ardon MD  [facility], [facility] - Gastroenterology  6/29/2022  3:52 PM

## 2022-06-29 NOTE — TELEPHONE ENCOUNTER
I would like it completed as it was ordered.  Please let the patient know the error by the lab and my apologies that she has to repeat it based on their error    Thanks    Brandt Witt MD  [facility], [facility] - Gastroenterology  6/29/2022  4:52 PM

## 2022-06-30 NOTE — TELEPHONE ENCOUNTER
New order placed    Thanks    Ophelia Horvath MD  [facility], [facility] - Gastroenterology  6/30/2022  12:43 PM

## 2022-06-30 NOTE — TELEPHONE ENCOUNTER
Dr. Alyson Boss,    I contacted patient and verified . I reviewed result note below and informed her of error with lab regarding fecal calprotectin. Please place new order, thank you.

## 2022-07-07 ENCOUNTER — LAB ENCOUNTER (OUTPATIENT)
Dept: LAB | Facility: HOSPITAL | Age: 27
End: 2022-07-07
Attending: INTERNAL MEDICINE
Payer: COMMERCIAL

## 2022-07-07 PROCEDURE — 83993 ASSAY FOR CALPROTECTIN FECAL: CPT | Performed by: INTERNAL MEDICINE

## 2022-07-10 LAB — CALPROTECTIN, FECAL: 33 UG/G

## 2022-12-19 ENCOUNTER — OFFICE VISIT (OUTPATIENT)
Dept: INTERNAL MEDICINE CLINIC | Facility: CLINIC | Age: 27
End: 2022-12-19
Payer: COMMERCIAL

## 2022-12-19 ENCOUNTER — HOSPITAL ENCOUNTER (OUTPATIENT)
Dept: GENERAL RADIOLOGY | Facility: HOSPITAL | Age: 27
Discharge: HOME | End: 2022-12-19
Attending: INTERNAL MEDICINE
Payer: COMMERCIAL

## 2022-12-19 VITALS
DIASTOLIC BLOOD PRESSURE: 74 MMHG | TEMPERATURE: 98 F | SYSTOLIC BLOOD PRESSURE: 107 MMHG | WEIGHT: 126 LBS | HEIGHT: 61 IN | HEART RATE: 66 BPM | BODY MASS INDEX: 23.79 KG/M2

## 2022-12-19 DIAGNOSIS — M89.9 LUMP OF RIB: Primary | ICD-10-CM

## 2022-12-19 DIAGNOSIS — M89.9 LUMP OF RIB: ICD-10-CM

## 2022-12-19 PROCEDURE — 3074F SYST BP LT 130 MM HG: CPT | Performed by: INTERNAL MEDICINE

## 2022-12-19 PROCEDURE — 3008F BODY MASS INDEX DOCD: CPT | Performed by: INTERNAL MEDICINE

## 2022-12-19 PROCEDURE — 71101 X-RAY EXAM UNILAT RIBS/CHEST: CPT | Performed by: INTERNAL MEDICINE

## 2022-12-19 PROCEDURE — 3078F DIAST BP <80 MM HG: CPT | Performed by: INTERNAL MEDICINE

## 2022-12-19 PROCEDURE — 99213 OFFICE O/P EST LOW 20 MIN: CPT | Performed by: INTERNAL MEDICINE

## 2024-10-12 NOTE — PROGRESS NOTES
Subjective:     Patient ID: Chikis Luo is a 29 year old female.    HPI    History/Other: Today due to vaginal problem according to her 2 weeks ago while she was training in the bathroom to have bowel movement she felt a bulge sticking out from her vagina.  She states that she pushed back.  She denies any urinary incontinence.  She also needs refill on her hydroxyzine  Review of Systems   Constitutional: Negative.    HENT: Negative.     Eyes: Negative.    Respiratory: Negative.     Cardiovascular: Negative.    Gastrointestinal: Negative.    Endocrine: Negative.    Genitourinary: Negative.    Musculoskeletal: Negative.    Skin: Negative.    Allergic/Immunologic: Negative.    Neurological: Negative.    Hematological: Negative.    Psychiatric/Behavioral: Negative.       Current Outpatient Medications   Medication Sig Dispense Refill    hydrOXYzine 25 MG Oral Tab Take 1 tablet (25 mg total) by mouth daily as needed for Itching. 30 tablet 0    Fluticasone Propionate 50 MCG/ACT Nasal Suspension 2 sprays by Each Nare route daily. 1 Bottle 1    PATIENT SUPPLIED MEDICATION Take 1 tablet by mouth daily. Natures made brand       Allergies:Allergies[1]    Past Medical History:    Allergic rhinitis    Anxiety    Depression      History reviewed. No pertinent surgical history.   Family History   Problem Relation Age of Onset    Heart Disorder Mother     Hypertension Mother     Other (hypothyroid) Mother     Obesity Mother     Stroke Mother       Social History:   Social History     Socioeconomic History    Marital status:     Number of children: 0   Tobacco Use    Smoking status: Former     Current packs/day: 0.00     Types: Cigarettes     Quit date: 10/31/2020     Years since quitting: 3.9    Smokeless tobacco: Never    Tobacco comments:     quit 2020   Vaping Use    Vaping status: Former    Quit date: 10/23/2020    Substances: Nicotine    Devices: Disposable   Substance and Sexual Activity    Alcohol use: Yes      Alcohol/week: 2.0 standard drinks of alcohol     Types: 2 Standard drinks or equivalent per week     Comment: 4-5 drinks per month    Drug use: Never     Types: Cocaine     Comment: patient stated she never had cocaine   Other Topics Concern     Service No    Blood Transfusions No    Caffeine Concern Yes     Comment: not on daily basis    Occupational Exposure No    Hobby Hazards No    Sleep Concern No    Stress Concern No    Weight Concern No    Special Diet No    Back Care No    Exercise Yes     Comment: 2-3 times per week    Bike Helmet No    Seat Belt Yes    Self-Exams Yes   Social History Narrative    The patient does not use an assistive device..      The patient does live in a home with stairs.     Social Drivers of Health      Received from [facility]    [facility]        Objective:   Physical Exam  Vitals and nursing note reviewed.   Constitutional:       Appearance: Normal appearance.   HENT:      Head: Normocephalic and atraumatic.   Cardiovascular:      Rate and Rhythm: Normal rate and regular rhythm.      Pulses: Normal pulses.      Heart sounds: Normal heart sounds.   Pulmonary:      Effort: Pulmonary effort is normal.      Breath sounds: Normal breath sounds.   Abdominal:      Palpations: Abdomen is soft.   Genitourinary:     Labia:         Right: No rash, tenderness, lesion or injury.         Left: No rash, tenderness, lesion or injury.    Musculoskeletal:         General: Normal range of motion.      Cervical back: Normal range of motion and neck supple.   Skin:     General: Skin is warm.   Neurological:      Mental Status: She is alert. Mental status is at baseline.         Assessment & Plan:   1. Vaginal prolapse     follow-up with OB she already scheduled appointment    No orders of the defined types were placed in this encounter.      Meds This Visit:  Requested Prescriptions     Signed Prescriptions Disp Refills    hydrOXYzine 25 MG Oral Tab 30 tablet 0     Sig: Take 1 tablet (25 mg  total) by mouth daily as needed for Itching.       Imaging & Referrals:  None            [1] No Known Allergies

## 2024-11-20 NOTE — PROGRESS NOTES
Subjective:     Patient ID: Chikis Luo is a 29 year old female.    Sore Throat         History/Other: She came in today due to congestion and sore throat according the patient her symptoms started 1 week ago.  She started having some more congestion runny nose for 2 days she did have sore throat her sore throat resolved she continued to feel congested and tried to months but she denies any fevers chills shortness of breath no cough.  Today her sore throat started again.  She denies any fever or chills.  She denies any sinus pressure or congestion  Review of Systems   Constitutional: Negative.    HENT:  Positive for sore throat.    Eyes: Negative.    Respiratory: Negative.     Cardiovascular: Negative.    Gastrointestinal: Negative.    Endocrine: Negative.    Genitourinary: Negative.    Musculoskeletal: Negative.    Skin: Negative.    Neurological: Negative.    Hematological: Negative.    Psychiatric/Behavioral: Negative.       Current Outpatient Medications   Medication Sig Dispense Refill    hydrOXYzine 25 MG Oral Tab Take 1 tablet (25 mg total) by mouth daily as needed for Itching. 30 tablet 0    Fluticasone Propionate 50 MCG/ACT Nasal Suspension 2 sprays by Each Nare route daily. 1 Bottle 1    PATIENT SUPPLIED MEDICATION Take 1 tablet by mouth daily. Natures made brand       Allergies:Allergies[1]    Past Medical History:    Allergic rhinitis    Anxiety    Depression      No past surgical history on file.   Family History   Problem Relation Age of Onset    Heart Disorder Mother     Hypertension Mother     Other (hypothyroid) Mother     Obesity Mother     Stroke Mother       Social History:   Social History     Socioeconomic History    Marital status:     Number of children: 0   Tobacco Use    Smoking status: Former     Current packs/day: 0.00     Types: Cigarettes     Quit date: 10/31/2020     Years since quittin.0    Smokeless tobacco: Never    Tobacco comments:     quit    Vaping Use    Vaping  status: Former    Quit date: 10/23/2020    Substances: Nicotine    Devices: Disposable   Substance and Sexual Activity    Alcohol use: Yes     Alcohol/week: 2.0 standard drinks of alcohol     Types: 2 Standard drinks or equivalent per week     Comment: 4-5 drinks per month    Drug use: Never     Types: Cocaine     Comment: patient stated she never had cocaine   Other Topics Concern     Service No    Blood Transfusions No    Caffeine Concern Yes     Comment: not on daily basis    Occupational Exposure No    Hobby Hazards No    Sleep Concern No    Stress Concern No    Weight Concern No    Special Diet No    Back Care No    Exercise Yes     Comment: 2-3 times per week    Bike Helmet No    Seat Belt Yes    Self-Exams Yes   Social History Narrative    The patient does not use an assistive device..      The patient does live in a home with stairs.     Social Drivers of Health      Received from [facility]        Objective:   Physical Exam  Vitals and nursing note reviewed.   Constitutional:       Appearance: Normal appearance.   HENT:      Head: Normocephalic and atraumatic.   Cardiovascular:      Rate and Rhythm: Normal rate and regular rhythm.      Pulses: Normal pulses.      Heart sounds: Normal heart sounds.   Pulmonary:      Breath sounds: Normal breath sounds.   Abdominal:      Palpations: Abdomen is soft.   Musculoskeletal:         General: Normal range of motion.      Cervical back: Normal range of motion and neck supple.   Skin:     General: Skin is warm.   Neurological:      Mental Status: She is alert. Mental status is at baseline.         Assessment & Plan:   No diagnosis found.  URI acute most likely viral rapid strep is negative advised her to drink more fluids, salt gargles can help, can take over-the-counter antihistamine, steam can help  No orders of the defined types were placed in this encounter.      Meds This Visit:  Requested Prescriptions      No prescriptions requested or  ordered in this encounter       Imaging & Referrals:  None            [1] No Known Allergies

## 2024-12-12 NOTE — PROGRESS NOTES
Subjective:     Patient ID: Chikis Luo is a 29 year old female.    HPI    History/Other:   She is here today follow  up on her labs . She had blood work done with psychiatry and wants to review   She was told that her tsh is high  and also she is anemic   She is tired     Review of Systems   Constitutional: Negative.    HENT: Negative.     Eyes: Negative.    Respiratory: Negative.     Cardiovascular: Negative.    Gastrointestinal: Negative.    Endocrine: Negative.    Genitourinary: Negative.    Musculoskeletal: Negative.    Skin: Negative.    Neurological: Negative.    Hematological: Negative.    Psychiatric/Behavioral: Negative.       Current Outpatient Medications   Medication Sig Dispense Refill    cholecalciferol (VITAMIN D3) 125 MCG (5000 UT) Oral Cap Take 1 capsule (5,000 Units total) by mouth daily. gummies      Iron, Ferrous Sulfate, 325 (65 Fe) MG Oral Tab Take 325 mg by mouth daily. 90 tablet 0    ergocalciferol 1.25 MG (71376 UT) Oral Cap Take 1 capsule (50,000 Units total) by mouth once a week. 4 capsule 0    hydrOXYzine 25 MG Oral Tab Take 1 tablet (25 mg total) by mouth daily as needed for Itching. 30 tablet 0    PATIENT SUPPLIED MEDICATION Take 1 tablet by mouth daily. Natures made brand      Fluticasone Propionate 50 MCG/ACT Nasal Suspension 2 sprays by Each Nare route daily. 1 Bottle 1     Allergies:Allergies[1]    Past Medical History:    Allergic rhinitis    Anxiety    Depression      History reviewed. No pertinent surgical history.   Family History   Problem Relation Age of Onset    Heart Disorder Mother     Hypertension Mother     Other (hypothyroid) Mother     Obesity Mother     Stroke Mother       Social History:   Social History     Socioeconomic History    Marital status:     Number of children: 0   Tobacco Use    Smoking status: Former     Current packs/day: 0.00     Types: Cigarettes     Quit date: 10/31/2020     Years since quittin.1    Smokeless tobacco: Never    Tobacco  comments:     quit 2020   Vaping Use    Vaping status: Former    Quit date: 10/23/2020    Substances: Nicotine    Devices: Disposable   Substance and Sexual Activity    Alcohol use: Yes     Alcohol/week: 2.0 standard drinks of alcohol     Types: 2 Standard drinks or equivalent per week     Comment: 4-5 drinks per month    Drug use: Never     Types: Cocaine     Comment: patient stated she never had cocaine   Other Topics Concern     Service No    Blood Transfusions No    Caffeine Concern Yes     Comment: not on daily basis    Occupational Exposure No    Hobby Hazards No    Sleep Concern No    Stress Concern No    Weight Concern No    Special Diet No    Back Care No    Exercise Yes     Comment: 2-3 times per week    Bike Helmet No    Seat Belt Yes    Self-Exams Yes   Social History Narrative    The patient does not use an assistive device..      The patient does live in a home with stairs.     Social Drivers of Health      Received from [facility]    [facility]        Objective:   Physical Exam  Vitals and nursing note reviewed.   Constitutional:       Appearance: Normal appearance.   HENT:      Head: Normocephalic and atraumatic.   Cardiovascular:      Rate and Rhythm: Normal rate and regular rhythm.      Pulses: Normal pulses.      Heart sounds: Normal heart sounds.   Pulmonary:      Effort: Pulmonary effort is normal.      Breath sounds: Normal breath sounds.   Abdominal:      Palpations: Abdomen is soft.   Musculoskeletal:         General: Normal range of motion.      Cervical back: Normal range of motion and neck supple.   Skin:     General: Skin is warm.   Neurological:      Mental Status: She is alert. Mental status is at baseline.         Assessment & Plan:   1. Subclinical hypothyroidism - will check tsh and tpo in 6 -8 weeks    2. Other iron deficiency anemia check iron panel, ferritin,    3 vitamin d deficiency - continue with ergocalciferol    Orders Placed This Encounter   Procedures    TSH W  Reflex To Free T4 [E]    Thyroid Peroxidase (TPO) AB [E]    Iron And Tibc [E]    Ferritin    CBC W Differential W Platelet [E]       Meds This Visit:  Requested Prescriptions     Signed Prescriptions Disp Refills    Iron, Ferrous Sulfate, 325 (65 Fe) MG Oral Tab 90 tablet 0     Sig: Take 325 mg by mouth daily.    ergocalciferol 1.25 MG (83617 UT) Oral Cap 4 capsule 0     Sig: Take 1 capsule (50,000 Units total) by mouth once a week.       Imaging & Referrals:  None            [1] No Known Allergies

## 2025-03-07 NOTE — PROGRESS NOTES
Subjective:     Patient ID: Chikis Luo is a 30 year old female.    Upper Respiratory Infection         History/Other:     She came in today complaining of upper respiratory symptoms.  According to her this is ongoing for 4 days.  She does have some congestion, she noticed some swelling of the lymph nodes on the right side of the neck and occipital.  Initially she did have sore throat but it is better now.  She still has some scratchy throat, no fever or chills    Review of Systems   Constitutional: Negative.    HENT: Negative.     Eyes: Negative.    Respiratory: Negative.     Cardiovascular: Negative.    Gastrointestinal: Negative.    Genitourinary: Negative.    Musculoskeletal: Negative.    Skin: Negative.    Neurological: Negative.    Hematological: Negative.    Psychiatric/Behavioral: Negative.       Current Outpatient Medications   Medication Sig Dispense Refill    lamoTRIgine 25 MG Oral Tab Take 0.5 tablets (12.5 mg total) by mouth daily.      amoxicillin clavulanate 875-125 MG Oral Tab Take 1 tablet by mouth 2 (two) times daily. 14 tablet 0    cholecalciferol (VITAMIN D3) 125 MCG (5000 UT) Oral Cap Take 1 capsule (5,000 Units total) by mouth daily. gummies      Iron, Ferrous Sulfate, 325 (65 Fe) MG Oral Tab Take 325 mg by mouth daily. 90 tablet 0    hydrOXYzine 25 MG Oral Tab Take 1 tablet (25 mg total) by mouth daily as needed for Itching. 30 tablet 0    PATIENT SUPPLIED MEDICATION Take 1 tablet by mouth daily. Natures made brand      Fluticasone Propionate 50 MCG/ACT Nasal Suspension 2 sprays by Each Nare route daily. 1 Bottle 1     Allergies:Allergies[1]    Past Medical History:    Allergic rhinitis    Anxiety    Depression      No past surgical history on file.   Family History   Problem Relation Age of Onset    Heart Disorder Mother     Hypertension Mother     Other (hypothyroid) Mother     Obesity Mother     Stroke Mother       Social History:   Social History     Socioeconomic History    Marital  status:     Number of children: 0   Tobacco Use    Smoking status: Former     Current packs/day: 0.00     Types: Cigarettes     Quit date: 10/31/2020     Years since quittin.3    Smokeless tobacco: Never    Tobacco comments:     quit    Vaping Use    Vaping status: Former    Quit date: 10/23/2020    Substances: Nicotine    Devices: Disposable   Substance and Sexual Activity    Alcohol use: Yes     Alcohol/week: 2.0 standard drinks of alcohol     Types: 2 Standard drinks or equivalent per week     Comment: 4-5 drinks per month    Drug use: Never     Types: Cocaine     Comment: patient stated she never had cocaine   Other Topics Concern     Service No    Blood Transfusions No    Caffeine Concern Yes     Comment: not on daily basis    Occupational Exposure No    Hobby Hazards No    Sleep Concern No    Stress Concern No    Weight Concern No    Special Diet No    Back Care No    Exercise Yes     Comment: 2-3 times per week    Bike Helmet No    Seat Belt Yes    Self-Exams Yes   Social History Narrative    The patient does not use an assistive device..      The patient does live in a home with stairs.     Social Drivers of Health     Food Insecurity: No Food Insecurity (2024)    NCSS - Food Insecurity     Worried About Running Out of Food in the Last Year: No     Ran Out of Food in the Last Year: No   Transportation Needs: No Transportation Needs (2024)    NCSS - Transportation     Lack of Transportation: No   Housing Stability: Not At Risk (2024)    NCSS - Housing/Utilities     Has Housing: Yes     Worried About Losing Housing: No     Unable to Get Utilities: No        Objective:   Physical Exam  Vitals and nursing note reviewed.   Constitutional:       Appearance: Normal appearance.   HENT:      Head: Normocephalic and atraumatic.   Cardiovascular:      Rate and Rhythm: Normal rate and regular rhythm.      Pulses: Normal pulses.      Heart sounds: Normal heart sounds.   Pulmonary:       Breath sounds: Normal breath sounds.   Musculoskeletal:      Cervical back: Normal range of motion and neck supple.   Neurological:      Mental Status: She is alert.         Assessment & Plan:   1. Lymphadenopathy    Most likely reactive ,will start abx , take with food, drink more fluids, steam can help with congestion    No orders of the defined types were placed in this encounter.      Meds This Visit:  Requested Prescriptions     Signed Prescriptions Disp Refills    amoxicillin clavulanate 875-125 MG Oral Tab 14 tablet 0     Sig: Take 1 tablet by mouth 2 (two) times daily.       Imaging & Referrals:  None            [1] No Known Allergies

## (undated) NOTE — ED AVS SNAPSHOT
Anay Mendoza   MRN: [id number]    Department:  [facility] Emergency Department   Date of Visit:  1/1/2020           Disclosure     Insurance plans vary and the physician(s) referred by the ER may not be covered by your plan.  Please contact your CARE PHYSICIAN AT ONCE OR RETURN IMMEDIATELY TO THE EMERGENCY DEPARTMENT. If you have been prescribed any medication(s), please fill your prescription right away and begin taking the medication(s) as directed.   If you believe that any of the medications